# Patient Record
Sex: MALE | Race: WHITE
[De-identification: names, ages, dates, MRNs, and addresses within clinical notes are randomized per-mention and may not be internally consistent; named-entity substitution may affect disease eponyms.]

---

## 2022-10-26 PROBLEM — Z00.00 ENCOUNTER FOR PREVENTIVE HEALTH EXAMINATION: Status: ACTIVE | Noted: 2022-10-26

## 2022-10-28 ENCOUNTER — APPOINTMENT (OUTPATIENT)
Dept: OTOLARYNGOLOGY | Facility: CLINIC | Age: 47
End: 2022-10-28

## 2022-10-28 VITALS
OXYGEN SATURATION: 97 % | RESPIRATION RATE: 18 BRPM | WEIGHT: 185 LBS | BODY MASS INDEX: 25.9 KG/M2 | HEART RATE: 58 BPM | HEIGHT: 71 IN | DIASTOLIC BLOOD PRESSURE: 79 MMHG | SYSTOLIC BLOOD PRESSURE: 120 MMHG | TEMPERATURE: 98.2 F

## 2022-10-28 DIAGNOSIS — Z78.9 OTHER SPECIFIED HEALTH STATUS: ICD-10-CM

## 2022-10-28 DIAGNOSIS — H93.13 TINNITUS, BILATERAL: ICD-10-CM

## 2022-10-28 DIAGNOSIS — Z81.1 FAMILY HISTORY OF ALCOHOL ABUSE AND DEPENDENCE: ICD-10-CM

## 2022-10-28 DIAGNOSIS — J30.1 ALLERGIC RHINITIS DUE TO POLLEN: ICD-10-CM

## 2022-10-28 DIAGNOSIS — H61.22 IMPACTED CERUMEN, LEFT EAR: ICD-10-CM

## 2022-10-28 PROCEDURE — 92557 COMPREHENSIVE HEARING TEST: CPT

## 2022-10-28 PROCEDURE — 31231 NASAL ENDOSCOPY DX: CPT

## 2022-10-28 PROCEDURE — 92550 TYMPANOMETRY & REFLEX THRESH: CPT | Mod: 52

## 2022-10-28 PROCEDURE — 99204 OFFICE O/P NEW MOD 45 MIN: CPT | Mod: 25

## 2022-10-29 PROBLEM — H93.13 BILATERAL TINNITUS: Status: ACTIVE | Noted: 2022-10-28

## 2022-10-29 PROBLEM — J30.1 ACUTE ALLERGIC RHINITIS DUE TO POLLEN: Status: ACTIVE | Noted: 2022-10-29

## 2022-10-29 NOTE — PHYSICAL EXAM
[Normal] : mucosa is normal [de-identified] : mild TMJ  [de-identified] : r nl, l copious cerumen removed atraumatically with suction- under microscope, b TMs nl  [de-identified] : mild ar [de-identified] : mild ar [de-identified] : gait steady

## 2022-10-29 NOTE — HISTORY OF PRESENT ILLNESS
[de-identified] : 46 y/o M presenting with b continuous and pulsatile (both)  high pitched, and rumbling tinnitus for the past 2.5 years, which has been gradually worsening. He had COVID two years ago and first noticed it then only in a quiet room, but recently it has become more bothersome. He describes the noise as a "default motor." He denies changes in hearing. He uses qtips. His PCP Dr. Camacho referred him here. He has h/o loud noise exposure. He denies allergies but has chronic postnasal drip, for years. No FH pertinent to cc. He smoke cigarettes, and marijuana. Urged to quit. He used to grind his teeth. \par

## 2022-10-29 NOTE — ASSESSMENT
[FreeTextEntry1] : 1. l cerumen impaction \par -cerumen removed\par -ears felt better\par -avoid qtip usage\par 2. b pulsatile tinnitus  persistent after cerumen removal\par - showed b symmetric borderline nl hearing -results reviewed with pt \par -discussed smoking cessation; he has mild tmj - discussed - he feels it is not the issue; bp ok, npx ok\par -MRI - ro mass such as glomus tumor\par -MRA head and neck (r/o aneurysm, avm, stenotic vessel)\par -MRV head and neck (r/o thrombosis/dural-sinus fistula)\par -air pod maskers\par -increase background noise/ try white noise machine\par -trt -information sheet given \par -for mild TMJ Aleve BID x 10 days if pt can tolerate\par -Soft diet\par -Avoid bruxism and chewing gum\par -Followup with dentist for mouth guard- he has one\par 3. postnasal drip - mild ar\par -Flonase- he has this\par RTC with MRI, MRA, MRV to review findings

## 2022-10-29 NOTE — PROCEDURE
[Cerumen Impaction] : Cerumen Impaction [Same] : same as the Pre Op Dx. [] : Removal of Cerumen [Anterior rhinoscopy insufficient to account for symptoms] : anterior rhinoscopy insufficient to account for symptoms [Topical Lidocaine] : topical lidocaine [Oxymetazoline HCl] : oxymetazoline HCl [Flexible Endoscope] : examined with the flexible endoscope [Normal] : the paranasal sinuses had no abnormalities [Serial Number: ___] : Serial Number: [unfilled] [Allergic] : allergic signs [FreeTextEntry6] : done for b pulsatile tinnitus, postnasal drip to r/o npx mass,blocking et's or sinusitis [FreeTextEntry5] : r nl, l copious cerumen removed atraumatically with suction- under microscope, b TMs nl

## 2022-11-18 ENCOUNTER — APPOINTMENT (OUTPATIENT)
Dept: OTOLARYNGOLOGY | Facility: CLINIC | Age: 47
End: 2022-11-18

## 2022-11-18 VITALS
WEIGHT: 185 LBS | TEMPERATURE: 98.2 F | RESPIRATION RATE: 16 BRPM | HEIGHT: 71 IN | DIASTOLIC BLOOD PRESSURE: 89 MMHG | BODY MASS INDEX: 25.9 KG/M2 | HEART RATE: 68 BPM | OXYGEN SATURATION: 97 % | SYSTOLIC BLOOD PRESSURE: 146 MMHG

## 2022-11-18 DIAGNOSIS — H93.A3 PULSATILE TINNITUS, BILATERAL: ICD-10-CM

## 2022-11-18 PROCEDURE — 99213 OFFICE O/P EST LOW 20 MIN: CPT

## 2022-11-18 NOTE — HISTORY OF PRESENT ILLNESS
[de-identified] : 3 week followup appt for this 48 yo M with pulsatile tinnitus he found bothersome at last visit. As he continued to have sx after cerumen was removed, mri mra and mrv were ordered - he is here to review the results. He reports his sx have improved significantly and he is under less stress.

## 2022-11-18 NOTE — DATA REVIEWED
[de-identified] : mri brain nl - reviewed with pt\par mra and mrv brain nl - reviewed with pt\par mra neck nl - reviewed with pt

## 2022-11-18 NOTE — ASSESSMENT
[FreeTextEntry1] : pulsatile tinnitus improved\par mri, mra head and neck, mrv head all nl- reviewed images and reports with pt\par discussed options - inc backgd noise, trt, masking - he prefers to hold off\par rtc as needed